# Patient Record
(demographics unavailable — no encounter records)

---

## 2017-02-03 DIAGNOSIS — E11.9 TYPE 2 DIABETES MELLITUS WITHOUT COMPLICATION: ICD-10-CM

## 2017-02-24 NOTE — TELEPHONE ENCOUNTER
----- Message from Alycia Benson sent at 2/24/2017 10:20 AM CST -----  Contact: Yann CERVANTES in [facility] in [place]   [phone]  fax #[phone]  Needs new script for Lantus Solostar 100 units per ml, injects 22 units 2 times a day.

## 2017-02-27 RX ORDER — INSULIN GLARGINE 100 [IU]/ML
INJECTION, SOLUTION SUBCUTANEOUS
Qty: 15 ML | Refills: 10 | Status: SHIPPED | OUTPATIENT
Start: 2017-02-27

## 2017-08-08 RX ORDER — BLOOD-GLUCOSE METER
EACH MISCELLANEOUS
Qty: 100 STRIP | Refills: 2 | OUTPATIENT
Start: 2017-08-08

## 2017-09-04 RX ORDER — ERGOCALCIFEROL 1.25 MG/1
50000 CAPSULE ORAL
Qty: 5 CAPSULE | Refills: 11 | OUTPATIENT
Start: 2017-09-04

## 2017-09-05 RX ORDER — ERGOCALCIFEROL 1.25 MG/1
CAPSULE ORAL
Qty: 5 CAPSULE | Refills: 9 | OUTPATIENT
Start: 2017-09-05

## 2017-09-05 NOTE — TELEPHONE ENCOUNTER
----- Message from Shama Gongora sent at 9/5/2017  4:32 PM CDT -----  Contact: Shayy CERVANTES  Calling to clarification of how many refills. Please call Shayy KIRAN @ [phone]. Thanks, tsering

## 2017-09-05 NOTE — TELEPHONE ENCOUNTER
Returned call to pt's pharmacy. Patient has not been seen in clinic for over 1yr. Needs appt.//rlt